# Patient Record
Sex: MALE | Race: WHITE | Employment: FULL TIME | URBAN - METROPOLITAN AREA
[De-identification: names, ages, dates, MRNs, and addresses within clinical notes are randomized per-mention and may not be internally consistent; named-entity substitution may affect disease eponyms.]

---

## 2017-10-18 ENCOUNTER — OFFICE VISIT (OUTPATIENT)
Dept: FAMILY MEDICINE CLINIC | Facility: CLINIC | Age: 32
End: 2017-10-18

## 2017-10-18 VITALS
WEIGHT: 202 LBS | TEMPERATURE: 98 F | HEART RATE: 88 BPM | DIASTOLIC BLOOD PRESSURE: 78 MMHG | SYSTOLIC BLOOD PRESSURE: 124 MMHG | OXYGEN SATURATION: 98 %

## 2017-10-18 DIAGNOSIS — J01.00 ACUTE NON-RECURRENT MAXILLARY SINUSITIS: Primary | ICD-10-CM

## 2017-10-18 DIAGNOSIS — F17.200 TOBACCO USE DISORDER: ICD-10-CM

## 2017-10-18 PROCEDURE — 99203 OFFICE O/P NEW LOW 30 MIN: CPT | Performed by: PHYSICIAN ASSISTANT

## 2017-10-18 RX ORDER — AMOXICILLIN AND CLAVULANATE POTASSIUM 875; 125 MG/1; MG/1
1 TABLET, FILM COATED ORAL 2 TIMES DAILY
Qty: 20 TABLET | Refills: 0 | Status: SHIPPED | OUTPATIENT
Start: 2017-10-18 | End: 2017-10-28

## 2017-10-18 NOTE — PROGRESS NOTES
CHIEF COMPLAINT:   Patient presents with:  URI: x 2-3 weeks. HPI:   Betina [place] is a 29year old male who presents for cold symptoms for  3  weeks. Symptoms have progressed into sinus congestion and been worsening since onset.  Sinus congestion/boubacar NOSE: nostrils patent, yellow/thick nasal mucous, nasal mucosa reddened and edematous, dried blood R nares,   THROAT: oral mucosa pink, moist. No visible dental caries. Posterior pharynx is mildly erythematous. Without exudates, uvula midline, mmm.   No hyp 3.  If cough is productive, recommend over the counter Mucinex to help thin secretions and encourage productive cough to clear chest congestion.   If cough becomes dry or non-productive, then stop Mucinex and start over-the-counter Delsym to suppress or sto ABRS may be diagnosed if you’ve had an upper respiratory infection like a cold and cough for 10 or more days without improvement or with worsening symptoms. Your healthcare provider will ask about your symptoms and your medical history.  The provider will c © 9857-0751 [employer] [address]. [address] [facility] [place] [place]. All rights reserved. This information is not intended as a substitute for professional medical care. Always follow your healthcare professional's instructions.             The

## 2017-10-18 NOTE — PATIENT INSTRUCTIONS
1. Augmentin 875 mg twice daily for 10 days. Recommend probiotics while on this medication to prevent antibiotics associated diarrhea or yeast infections.   Examples include yogurt with active live cultures; or in capsule/granule forms such as Florastor, The symptoms of ABRS may be different for each person and include:  · Nasal congestion or blockage  · Pain or pressure in the face  · Thick, colored drainage from the nose  Other symptoms may include:  · Runny nose  · Fluid draining from the nose down the · Symptoms that don’t get better after 3 to 5 days on antibiotics  · Trouble seeing  · Swelling around your eyes  · Confusion or trouble staying awake   Date Last Reviewed: 5/1/2017  © 1205-6764 [employer] [address].  [address], [place]

## 2019-02-05 ENCOUNTER — OFFICE VISIT (OUTPATIENT)
Dept: FAMILY MEDICINE CLINIC | Facility: CLINIC | Age: 34
End: 2019-02-05
Payer: COMMERCIAL

## 2019-02-05 VITALS
OXYGEN SATURATION: 97 % | RESPIRATION RATE: 16 BRPM | SYSTOLIC BLOOD PRESSURE: 110 MMHG | BODY MASS INDEX: 32.13 KG/M2 | TEMPERATURE: 99 F | HEIGHT: 68 IN | HEART RATE: 84 BPM | DIASTOLIC BLOOD PRESSURE: 80 MMHG | WEIGHT: 212 LBS

## 2019-02-05 DIAGNOSIS — J10.1 INFLUENZA A: Primary | ICD-10-CM

## 2019-02-05 DIAGNOSIS — J02.9 SORE THROAT: ICD-10-CM

## 2019-02-05 DIAGNOSIS — R68.89 FLU-LIKE SYMPTOMS: ICD-10-CM

## 2019-02-05 LAB
CONTROL LINE PRESENT WITH A CLEAR BACKGROUND (YES/NO): YES YES/NO
POCT INFLUENZA A: POSITIVE
POCT INFLUENZA B: NEGATIVE
STREP GRP A CUL-SCR: NEGATIVE

## 2019-02-05 PROCEDURE — 99213 OFFICE O/P EST LOW 20 MIN: CPT | Performed by: PHYSICIAN ASSISTANT

## 2019-02-05 PROCEDURE — 87880 STREP A ASSAY W/OPTIC: CPT | Performed by: PHYSICIAN ASSISTANT

## 2019-02-05 PROCEDURE — 87502 INFLUENZA DNA AMP PROBE: CPT | Performed by: PHYSICIAN ASSISTANT

## 2019-02-05 RX ORDER — BENZONATATE 200 MG/1
200 CAPSULE ORAL 3 TIMES DAILY PRN
Qty: 30 CAPSULE | Refills: 0 | Status: SHIPPED | OUTPATIENT
Start: 2019-02-05 | End: 2020-11-19 | Stop reason: ALTCHOICE

## 2019-02-05 NOTE — PROGRESS NOTES
CHIEF COMPLAINT:   Patient presents with:  Cough: congestion/chills/dizziness/sore throat x 2 days. no fever      HPI:   Tomi Chen is a 34year old male who presents for c/o abrupt onset of influenza like symptoms x 2 days ago.  Reports tactile fever, c LUNGS: clear to auscultation bilaterally, no wheezes or rhonchi. Breathing is non labored. CARDIO: RRR without murmur  EXTREMITIES: no cyanosis, clubbing or edema  LYMPH:  no lymphadenopathy.         ASSESSMENT AND PLAN:   Aren Jansen is a 34year old ma Influenza is also called the flu. It is a viral illness that affects the air passages of your lungs. It is different from the common cold. The flu can easily be passed from one to person to another.  It may be spread through the air by coughing and sneezing If you are age 72 or older, talk with your provider about getting a pneumococcal vaccine every 5 years. You should also get this vaccine if you have chronic asthma or COPD. All adults should get a flu vaccine every fall. Ask your provider about this.   When

## 2019-02-05 NOTE — PATIENT INSTRUCTIONS
1.  Tessalon 200 mg up to three times daily as needed for cough.    2.  Encourage fluids, humidifier/vaporizor at bedside, elevate head of bed (sleep with extra pillow), vapor rub to chest, steam therapy if no fever, warm compresses for sinus pressure if no lungs.  · Over-the-counter cold medicines will not make the flu go away faster. But the medicines may help with coughing, sore throat, and congestion in your nose and sinuses. Don’t use a decongestant if you have high blood pressure.   · Stay home until you

## 2019-06-18 ENCOUNTER — OFFICE VISIT (OUTPATIENT)
Dept: FAMILY MEDICINE CLINIC | Facility: CLINIC | Age: 34
End: 2019-06-18
Payer: COMMERCIAL

## 2019-06-18 VITALS
HEART RATE: 77 BPM | OXYGEN SATURATION: 97 % | BODY MASS INDEX: 32.13 KG/M2 | TEMPERATURE: 98 F | DIASTOLIC BLOOD PRESSURE: 72 MMHG | HEIGHT: 68 IN | WEIGHT: 212 LBS | SYSTOLIC BLOOD PRESSURE: 106 MMHG | RESPIRATION RATE: 16 BRPM

## 2019-06-18 DIAGNOSIS — J02.9 SORE THROAT: ICD-10-CM

## 2019-06-18 DIAGNOSIS — K12.2 UVULITIS: Primary | ICD-10-CM

## 2019-06-18 DIAGNOSIS — R09.82 PND (POST-NASAL DRIP): ICD-10-CM

## 2019-06-18 PROCEDURE — 87880 STREP A ASSAY W/OPTIC: CPT | Performed by: PHYSICIAN ASSISTANT

## 2019-06-18 PROCEDURE — 99213 OFFICE O/P EST LOW 20 MIN: CPT | Performed by: PHYSICIAN ASSISTANT

## 2019-06-18 RX ORDER — AMOXICILLIN 875 MG/1
875 TABLET, COATED ORAL 2 TIMES DAILY
Qty: 20 TABLET | Refills: 0 | Status: SHIPPED | OUTPATIENT
Start: 2019-06-18 | End: 2019-06-28

## 2019-06-18 NOTE — PATIENT INSTRUCTIONS
-MOTRIN AS NEEDED  -MUST GO TO THE ER WITH ANY WORSENING SYMPTOMS  -MUST BE SEEN BY EYE DOCTOR TODAY AND IF NOT, NEED TO GO TO THE IC FOR EYE ISSUES    Uvulitis    The uvula is the tissue that hangs in the back of the throat.  Uvulitis is inflammation of · If medicines were prescribed, be sure they are taken as directed. They should be taken until they are gone or the healthcare provider says to stop them.   · If you were told that your angioedema was from a medicine that you are taking, you must stop [place] · Skin or lips look blue, purple, or gray  Fever and children  Always use a digital thermometer to check your child’s temperature. Never use a mercury thermometer. For infants and toddlers, be sure to use a rectal thermometer correctly.  A rectal thermomet

## 2019-06-18 NOTE — PROGRESS NOTES
CHIEF COMPLAINT:   Patient presents with:  Sore Throat: dizziness/right eye irritation x last night. no eye discharge. no cough/congestion/fever      HPI:   Annie Ferrari is a 34year old male who presents for sore throat sxs since yesterday.   Patient repo /72 (BP Location: Left arm, Patient Position: Sitting, Cuff Size: adult)   Pulse 77   Temp 98.3 °F (36.8 °C) (Oral)   Resp 16   Ht 68\"   Wt 212 lb   SpO2 97%   BMI 32.23 kg/m²   GENERAL: well developed, well nourished,in no apparent distress  SKIN: -Discussed PND likely contributing to sore throat-he will start taking mucinex OTC.  -Patient given option of either going to the IC to further work up eye complaint or going to the eye doctor today.   Discussed concern that he had a FB and need to rule out To help find the cause of the uvulitis, imaging tests may be done. If infection is suspected, swabs from the throat or samples of blood may be tested.  Questions may be asked about an individual’s vaccination history to be sure it is up to date. A cause for · Symptoms of dehydration, including dark urine, dry mouth, cracked lips, dizziness, or sunken eyes  · A child acting very sick or not improving  · Fever over 100.4°F (38°C) in adults, or as directed by your healthcare provider  · Fever in children (see Fe · Armpit temperature of 101°F (38.3°C) or higher, or as directed by the provider  Child of any age:  · Repeated temperature of 104°F (40°C) or higher, or as directed by the provider  · Fever that lasts more than 24 hours in a child under 3years old.  Or a

## 2020-01-31 ENCOUNTER — BEHAVIORAL HEALTH (OUTPATIENT)
Dept: BEHAVIORAL HEALTH | Age: 35
End: 2020-01-31

## 2020-01-31 ENCOUNTER — TELEPHONE (OUTPATIENT)
Dept: BEHAVIORAL HEALTH | Age: 35
End: 2020-01-31

## 2020-01-31 DIAGNOSIS — F43.10 PTSD (POST-TRAUMATIC STRESS DISORDER): Primary | ICD-10-CM

## 2020-01-31 PROCEDURE — 90791 PSYCH DIAGNOSTIC EVALUATION: CPT | Performed by: PSYCHOLOGIST

## 2020-03-18 ENCOUNTER — TELEPHONE (OUTPATIENT)
Dept: BEHAVIORAL HEALTH | Age: 35
End: 2020-03-18

## 2020-03-23 ENCOUNTER — BEHAVIORAL HEALTH (OUTPATIENT)
Dept: BEHAVIORAL HEALTH | Age: 35
End: 2020-03-23

## 2020-03-23 DIAGNOSIS — F43.10 PTSD (POST-TRAUMATIC STRESS DISORDER): Primary | ICD-10-CM

## 2020-03-23 PROCEDURE — 90834 PSYTX W PT 45 MINUTES: CPT | Performed by: PSYCHOLOGIST

## 2020-04-02 ENCOUNTER — TELEPHONE (OUTPATIENT)
Dept: FAMILY MEDICINE | Age: 35
End: 2020-04-02

## 2020-04-13 ENCOUNTER — BEHAVIORAL HEALTH (OUTPATIENT)
Dept: BEHAVIORAL HEALTH | Age: 35
End: 2020-04-13

## 2020-04-13 DIAGNOSIS — F43.10 PTSD (POST-TRAUMATIC STRESS DISORDER): Primary | ICD-10-CM

## 2020-04-13 PROCEDURE — 90837 PSYTX W PT 60 MINUTES: CPT | Performed by: PSYCHOLOGIST

## 2020-04-27 ENCOUNTER — TELEPHONE (OUTPATIENT)
Dept: BEHAVIORAL HEALTH | Age: 35
End: 2020-04-27

## 2020-05-04 ENCOUNTER — APPOINTMENT (OUTPATIENT)
Dept: BEHAVIORAL HEALTH | Age: 35
End: 2020-05-04

## 2020-05-30 ENCOUNTER — APPOINTMENT (OUTPATIENT)
Dept: BEHAVIORAL HEALTH | Age: 35
End: 2020-05-30

## 2020-06-01 ENCOUNTER — TELEPHONE (OUTPATIENT)
Dept: BEHAVIORAL HEALTH | Age: 35
End: 2020-06-01

## 2020-06-04 ENCOUNTER — APPOINTMENT (OUTPATIENT)
Dept: BEHAVIORAL HEALTH | Age: 35
End: 2020-06-04

## 2020-07-27 ENCOUNTER — APPOINTMENT (OUTPATIENT)
Dept: BEHAVIORAL HEALTH | Age: 35
End: 2020-07-27

## 2020-08-27 ENCOUNTER — BEHAVIORAL HEALTH (OUTPATIENT)
Dept: BEHAVIORAL HEALTH | Age: 35
End: 2020-08-27

## 2020-08-27 DIAGNOSIS — F43.10 PTSD (POST-TRAUMATIC STRESS DISORDER): Primary | ICD-10-CM

## 2020-08-27 PROCEDURE — 90837 PSYTX W PT 60 MINUTES: CPT | Performed by: PSYCHOLOGIST

## 2020-09-14 ENCOUNTER — TELEPHONE (OUTPATIENT)
Dept: BEHAVIORAL HEALTH | Age: 35
End: 2020-09-14

## 2020-10-09 ENCOUNTER — TELEPHONE (OUTPATIENT)
Dept: UROLOGY | Age: 35
End: 2020-10-09

## 2020-11-19 ENCOUNTER — OFFICE VISIT (OUTPATIENT)
Dept: FAMILY MEDICINE CLINIC | Facility: CLINIC | Age: 35
End: 2020-11-19
Payer: COMMERCIAL

## 2020-11-19 VITALS
SYSTOLIC BLOOD PRESSURE: 124 MMHG | HEIGHT: 68 IN | OXYGEN SATURATION: 98 % | DIASTOLIC BLOOD PRESSURE: 80 MMHG | HEART RATE: 81 BPM | BODY MASS INDEX: 26.22 KG/M2 | WEIGHT: 173 LBS | RESPIRATION RATE: 18 BRPM | TEMPERATURE: 98 F

## 2020-11-19 DIAGNOSIS — J06.9 URI WITH COUGH AND CONGESTION: Primary | ICD-10-CM

## 2020-11-19 DIAGNOSIS — Z20.822 SUSPECTED 2019 NOVEL CORONAVIRUS INFECTION: ICD-10-CM

## 2020-11-19 DIAGNOSIS — J02.0 STREP PHARYNGITIS: ICD-10-CM

## 2020-11-19 PROCEDURE — 99072 ADDL SUPL MATRL&STAF TM PHE: CPT | Performed by: PHYSICIAN ASSISTANT

## 2020-11-19 PROCEDURE — 99213 OFFICE O/P EST LOW 20 MIN: CPT | Performed by: PHYSICIAN ASSISTANT

## 2020-11-19 PROCEDURE — 3074F SYST BP LT 130 MM HG: CPT | Performed by: PHYSICIAN ASSISTANT

## 2020-11-19 PROCEDURE — 3008F BODY MASS INDEX DOCD: CPT | Performed by: PHYSICIAN ASSISTANT

## 2020-11-19 PROCEDURE — 3079F DIAST BP 80-89 MM HG: CPT | Performed by: PHYSICIAN ASSISTANT

## 2020-11-19 PROCEDURE — 87880 STREP A ASSAY W/OPTIC: CPT | Performed by: PHYSICIAN ASSISTANT

## 2020-11-19 RX ORDER — AMOXICILLIN 500 MG/1
500 TABLET, FILM COATED ORAL 3 TIMES DAILY
Qty: 30 TABLET | Refills: 0 | Status: SHIPPED | OUTPATIENT
Start: 2020-11-19 | End: 2020-11-29

## 2020-11-19 RX ORDER — ESCITALOPRAM OXALATE 5 MG/1
TABLET ORAL
COMMUNITY
Start: 2020-11-10

## 2020-11-19 NOTE — PROGRESS NOTES
CHIEF COMPLAINT:   Patient presents with:  Cold: X 1 day, Running noise, cough, headache, body soreness,  throat hurts, eye strain,         HPI:   Sinai Lino is a 37year old male who presents for body aches and headache since this am. No fever.  Eyes fe ASSESSMENT AND PLAN:     Harolyn Goodell is a 37year old male who presents with:     ASSESSMENT:  [place] with cough and congestion  (primary encounter diagnosis)  Suspected 2019 novel coronavirus infection  Strep pharyngitis    PLAN:  Meds as below.   Comfort C * You provided care at home to someone who is sick with COVID-19  * You had direct physical contact with the person (touched, hugged, or kissed them)  * You shared eating or drinking utensils  * They sneezed, coughed, or somehow got respiratory droplets on If you are awaiting test results or are confirmed positive for COVID -19, and your symptoms worsen at home with symptoms such as: extreme weakness, difficult breathing, or unrelenting fevers greater than 100.4 degrees Fahrenheit, you should contact your he [facility], in conjunction with Paulina Kim, is looking for patients who have recovered from COVID-19 and would be interested in donating plasma.     Convalescent plasma is a component of blood that, in people who have recovered from COVID-19, [email or web address].com/  https://www.cdc.gov/coronavirus/2019-ncov/

## 2020-11-19 NOTE — PATIENT INSTRUCTIONS
Rest   Fluids   Tylenol OTC as needed   Claritin OTC   Change toothbrush after 48 hours   Start antibiotic  Quarantine   ED for any chest pain, breathing issues   Please follow up with PCP if no improvement or if symptoms worsen   Coronavirus Disease 2019 out, avoid using any kind of public transportation, ridesharing, or taxis. 2. Monitor your symptoms carefully. If your symptoms get worse, call your healthcare provider immediately. 3. Get rest and stay hydrated.    4. If you have a medical appointment, guidelines:  • At least 24 hours have passed since recovery defined as resolution of fever without the use of fever-reducing medications; and  · Improvement in respiratory symptoms (e.g., cough, shortness of breath); and  · At least 10 days have passed sin [email or web address]    Who is eligible to donate convalescent plasma?     Potential convalescent plasma donors must:    · Have had a confirmed diagnosis of COVID-19  · Be symptom-free for at least 14 days*    *Some peo

## 2020-11-22 ENCOUNTER — OFFICE VISIT (OUTPATIENT)
Dept: FAMILY MEDICINE CLINIC | Facility: CLINIC | Age: 35
End: 2020-11-22
Payer: COMMERCIAL

## 2020-11-22 VITALS
RESPIRATION RATE: 18 BRPM | TEMPERATURE: 99 F | OXYGEN SATURATION: 99 % | DIASTOLIC BLOOD PRESSURE: 64 MMHG | HEART RATE: 88 BPM | WEIGHT: 173 LBS | SYSTOLIC BLOOD PRESSURE: 128 MMHG | HEIGHT: 68 IN | BODY MASS INDEX: 26.22 KG/M2

## 2020-11-22 DIAGNOSIS — K13.79 UVULAR SWELLING: Primary | ICD-10-CM

## 2020-11-22 PROCEDURE — 99213 OFFICE O/P EST LOW 20 MIN: CPT | Performed by: FAMILY MEDICINE

## 2020-11-22 PROCEDURE — 3074F SYST BP LT 130 MM HG: CPT | Performed by: FAMILY MEDICINE

## 2020-11-22 PROCEDURE — 3008F BODY MASS INDEX DOCD: CPT | Performed by: FAMILY MEDICINE

## 2020-11-22 PROCEDURE — 3078F DIAST BP <80 MM HG: CPT | Performed by: FAMILY MEDICINE

## 2020-11-22 RX ORDER — PREDNISONE 20 MG/1
TABLET ORAL
Qty: 11 TABLET | Refills: 0 | Status: SHIPPED | OUTPATIENT
Start: 2020-11-22 | End: 2021-12-20 | Stop reason: ALTCHOICE

## 2020-11-22 NOTE — PATIENT INSTRUCTIONS
Self-Care for Sore Throats     Sore throats happen for many reasons, such as colds, allergies, cigarette smoke, air pollution, and infections caused by viruses or bacteria. In any case, your throat becomes red and sore.  Your goal for self-care is to redu · Limit contact with pets and with allergy-causing substances, such as pollen and mold. · Wash your hands often when you’re around someone with a sore throat or cold. This will keep viruses or bacteria from spreading.   · Limit outdoor time when air pollut

## 2020-11-22 NOTE — PROGRESS NOTES
Charlene Morley is a 37year old male.     S:  Patient presents today with the following concerns:   Sore Throat (Dry cough, headache, cold - Entered by patient)  · Throat never hurt when he originally came in.  · Yesterday morning throat started really hurti encounter. Meds & Refills for this Visit:  Requested Prescriptions     Signed Prescriptions Disp Refills   • predniSONE 20 MG Oral Tab 11 tablet 0     Sig: Take 2 tabs daily for 3 days, then 1 tab daily for 3 days, then 1/2 tab daily for 3 days.      Jenni Baron

## 2020-11-27 ENCOUNTER — MED INFO FORMS (OUTPATIENT)
Dept: HEALTH INFORMATION MANAGEMENT | Facility: OTHER | Age: 35
End: 2020-11-27

## 2021-01-07 ENCOUNTER — OFFICE VISIT (OUTPATIENT)
Dept: UROLOGY | Age: 36
End: 2021-01-07

## 2021-01-07 VITALS — WEIGHT: 176.3 LBS | HEIGHT: 67 IN | BODY MASS INDEX: 27.67 KG/M2 | TEMPERATURE: 98.1 F

## 2021-01-07 DIAGNOSIS — Z30.09 STERILIZATION CONSULT: Primary | ICD-10-CM

## 2021-01-07 PROCEDURE — 99203 OFFICE O/P NEW LOW 30 MIN: CPT | Performed by: UROLOGY

## 2021-01-07 RX ORDER — PREDNISONE 20 MG/1
TABLET ORAL
COMMUNITY
Start: 2020-11-22

## 2021-01-07 RX ORDER — ESCITALOPRAM OXALATE 5 MG/1
TABLET ORAL
COMMUNITY
Start: 2020-11-10

## 2021-01-07 RX ORDER — DIAZEPAM 10 MG/1
10 TABLET ORAL ONCE
Qty: 1 TABLET | Refills: 0 | Status: SHIPPED | OUTPATIENT
Start: 2021-01-07 | End: 2021-01-07

## 2021-01-22 ENCOUNTER — OFFICE VISIT (OUTPATIENT)
Dept: UROLOGY | Age: 36
End: 2021-01-22

## 2021-01-22 DIAGNOSIS — Z30.2 ENCOUNTER FOR STERILIZATION: Primary | ICD-10-CM

## 2021-01-22 PROCEDURE — 55250 REMOVAL OF SPERM DUCT(S): CPT | Performed by: UROLOGY

## 2021-01-22 RX ORDER — HYDROCODONE BITARTRATE AND ACETAMINOPHEN 5; 325 MG/1; MG/1
1 TABLET ORAL EVERY 6 HOURS PRN
Qty: 25 TABLET | Refills: 0 | Status: SHIPPED | OUTPATIENT
Start: 2021-01-22

## 2021-07-19 ENCOUNTER — TELEPHONE (OUTPATIENT)
Dept: UROLOGY | Age: 36
End: 2021-07-19

## 2021-12-20 ENCOUNTER — OFFICE VISIT (OUTPATIENT)
Dept: FAMILY MEDICINE CLINIC | Facility: CLINIC | Age: 36
End: 2021-12-20
Payer: COMMERCIAL

## 2021-12-20 VITALS
HEART RATE: 70 BPM | RESPIRATION RATE: 16 BRPM | OXYGEN SATURATION: 98 % | SYSTOLIC BLOOD PRESSURE: 118 MMHG | WEIGHT: 204 LBS | TEMPERATURE: 98 F | DIASTOLIC BLOOD PRESSURE: 78 MMHG | HEIGHT: 68 IN | BODY MASS INDEX: 30.92 KG/M2

## 2021-12-20 DIAGNOSIS — J06.9 UPPER RESPIRATORY TRACT INFECTION, UNSPECIFIED TYPE: Primary | ICD-10-CM

## 2021-12-20 PROCEDURE — 87880 STREP A ASSAY W/OPTIC: CPT | Performed by: PHYSICIAN ASSISTANT

## 2021-12-20 PROCEDURE — 3074F SYST BP LT 130 MM HG: CPT | Performed by: PHYSICIAN ASSISTANT

## 2021-12-20 PROCEDURE — 99213 OFFICE O/P EST LOW 20 MIN: CPT | Performed by: PHYSICIAN ASSISTANT

## 2021-12-20 PROCEDURE — 3008F BODY MASS INDEX DOCD: CPT | Performed by: PHYSICIAN ASSISTANT

## 2021-12-20 PROCEDURE — 3078F DIAST BP <80 MM HG: CPT | Performed by: PHYSICIAN ASSISTANT

## 2021-12-20 NOTE — PATIENT INSTRUCTIONS
Viral Upper Respiratory Illness (Adult)    You have a viral upper respiratory illness (URI), which is another term for the common cold. This illness is contagious during the first few days. It is spread through the air by coughing and sneezing.  It may al decongestants if you have high blood pressure.)  Follow-up care  Follow up with your healthcare provider, or as advised.   When to seek medical advice  Call your healthcare provider right away if any of these occur:  · Cough with lots of colored sputum (muc least 15 minutes  * You provided care at home to someone who is sick with COVID-19  * You had direct physical contact with the person (touched, hugged, or kissed them)  * You shared eating or drinking utensils  * They sneezed, coughed, or somehow got respi healthcare provider ahead of time and tell them that you have or may have COVID-19.  5. For medical emergencies, call 911 and notify the dispatch personnel that you have or may have COVID-19.   6. Cover your cough and sneezes.    7. Wash your hands often wi first appeared OR if asymptomatic patient or date of symptom onset is unclear then use 10 days post COVID test date. · At least 20 days have passed for severe illness (requiring hospitalization) OR if you are immunocompromised.   If you have a fever with your plasma to help treat others diagnosed with the virus, please contact [facility] directly on their website: [email or web address]    Who is eligible to donate convalescent plasma?     Potential convalescent plasma donor Shortness of breath Hair loss   GI disturbances  Fever  Swelling Joint Pain  Cough         Who is at risk for a Post-COVID condition? It is still too early to say for sure.   Currently, we find the people who experience Post-COVID conditions to be random

## 2021-12-20 NOTE — PROGRESS NOTES
CHIEF COMPLAINT:     Patient presents with:  Covid: Head ach body fatigue stuffy and running noise - Entered by patient      HPI:   Regina Finch is a 31year old male who presents with headache, runny nose, myalgia. Symptoms since this morning.  Concerned 12/20/21  5:51 PM   Result Value Ref Range    Strep Grp A Screen neg Negative    Control Line Present with a clear background (yes/no) yes Yes/No    Kit Lot # [id number] Numeric    Kit Expiration Date 7/28/23 Date         ASSESSMENT AND PLAN:   Aren Jansen Instructions on file for this visit. The patient indicates understanding of these issues and agrees to the plan.   The patient is asked to follow up PCP

## (undated) NOTE — LETTER
Date: 2/5/2019    Patient Name: Betina [place]          To Whom it may concern: This letter has been written at the patient's request. The above patient was seen at the [facility] for treatment of a medical condition.     This patient should

## (undated) NOTE — LETTER
Date: 6/18/2019    Patient Name: Iván Mcgrath          To Whom it may concern: The above patient was seen at the [facility] for treatment of a medical condition. This patient should be excused from attending work 6/18/19.         Cat Chandra